# Patient Record
Sex: FEMALE | Race: WHITE | NOT HISPANIC OR LATINO | Employment: OTHER | URBAN - METROPOLITAN AREA
[De-identification: names, ages, dates, MRNs, and addresses within clinical notes are randomized per-mention and may not be internally consistent; named-entity substitution may affect disease eponyms.]

---

## 2024-03-05 ENCOUNTER — HOSPITAL ENCOUNTER (OUTPATIENT)
Dept: RADIOLOGY | Facility: CLINIC | Age: 69
Discharge: HOME | End: 2024-03-05
Payer: MEDICARE

## 2024-03-05 DIAGNOSIS — Z12.31 ENCOUNTER FOR SCREENING MAMMOGRAM FOR MALIGNANT NEOPLASM OF BREAST: ICD-10-CM

## 2024-03-05 PROCEDURE — 77067 SCR MAMMO BI INCL CAD: CPT | Performed by: RADIOLOGY

## 2024-03-05 PROCEDURE — 77067 SCR MAMMO BI INCL CAD: CPT

## 2024-03-05 PROCEDURE — 77063 BREAST TOMOSYNTHESIS BI: CPT | Performed by: RADIOLOGY

## 2024-07-22 ENCOUNTER — APPOINTMENT (OUTPATIENT)
Dept: CARDIOLOGY | Facility: CLINIC | Age: 69
End: 2024-07-22
Payer: MEDICARE

## 2024-07-22 VITALS
HEIGHT: 66 IN | OXYGEN SATURATION: 98 % | HEART RATE: 80 BPM | SYSTOLIC BLOOD PRESSURE: 132 MMHG | BODY MASS INDEX: 38.57 KG/M2 | WEIGHT: 240 LBS | DIASTOLIC BLOOD PRESSURE: 82 MMHG

## 2024-07-22 DIAGNOSIS — Z98.890 HISTORY OF CEA (CAROTID ENDARTERECTOMY): ICD-10-CM

## 2024-07-22 DIAGNOSIS — G89.29 CHRONIC MIDLINE LOW BACK PAIN WITHOUT SCIATICA: ICD-10-CM

## 2024-07-22 DIAGNOSIS — R00.0 SINUS TACHYCARDIA: Primary | ICD-10-CM

## 2024-07-22 DIAGNOSIS — M54.50 CHRONIC MIDLINE LOW BACK PAIN WITHOUT SCIATICA: ICD-10-CM

## 2024-07-22 DIAGNOSIS — E11.9 TYPE 2 DIABETES MELLITUS WITHOUT COMPLICATION, UNSPECIFIED WHETHER LONG TERM INSULIN USE (MULTI): ICD-10-CM

## 2024-07-22 DIAGNOSIS — I10 BENIGN ESSENTIAL HYPERTENSION: ICD-10-CM

## 2024-07-22 PROBLEM — I63.9 CEREBRAL INFARCTION (MULTI): Status: ACTIVE | Noted: 2024-07-22

## 2024-07-22 PROBLEM — D50.9 IRON DEFICIENCY ANEMIA: Status: ACTIVE | Noted: 2023-10-13

## 2024-07-22 PROBLEM — R94.31 ABNORMAL EKG: Status: ACTIVE | Noted: 2024-07-22

## 2024-07-22 PROBLEM — M47.26 OSTEOARTHRITIS OF SPINE WITH RADICULOPATHY, LUMBAR REGION: Status: ACTIVE | Noted: 2023-11-03

## 2024-07-22 PROBLEM — M41.27 OTHER IDIOPATHIC SCOLIOSIS, LUMBOSACRAL REGION: Status: ACTIVE | Noted: 2023-11-03

## 2024-07-22 PROBLEM — Z91.199 NON COMPLIANCE WITH MEDICAL TREATMENT: Status: ACTIVE | Noted: 2023-11-03

## 2024-07-22 PROBLEM — R79.89 ELEVATED LFTS: Status: ACTIVE | Noted: 2023-11-03

## 2024-07-22 PROBLEM — R19.5 POSITIVE COLORECTAL CANCER SCREENING USING COLOGUARD TEST: Status: ACTIVE | Noted: 2023-10-13

## 2024-07-22 PROBLEM — I77.9 BILATERAL CAROTID ARTERY DISEASE (CMS-HCC): Status: ACTIVE | Noted: 2024-07-22

## 2024-07-22 PROCEDURE — 3075F SYST BP GE 130 - 139MM HG: CPT | Performed by: INTERNAL MEDICINE

## 2024-07-22 PROCEDURE — 4010F ACE/ARB THERAPY RXD/TAKEN: CPT | Performed by: INTERNAL MEDICINE

## 2024-07-22 PROCEDURE — 1160F RVW MEDS BY RX/DR IN RCRD: CPT | Performed by: INTERNAL MEDICINE

## 2024-07-22 PROCEDURE — 3008F BODY MASS INDEX DOCD: CPT | Performed by: INTERNAL MEDICINE

## 2024-07-22 PROCEDURE — 99203 OFFICE O/P NEW LOW 30 MIN: CPT | Performed by: INTERNAL MEDICINE

## 2024-07-22 PROCEDURE — 3079F DIAST BP 80-89 MM HG: CPT | Performed by: INTERNAL MEDICINE

## 2024-07-22 PROCEDURE — 93000 ELECTROCARDIOGRAM COMPLETE: CPT | Performed by: INTERNAL MEDICINE

## 2024-07-22 PROCEDURE — 1159F MED LIST DOCD IN RCRD: CPT | Performed by: INTERNAL MEDICINE

## 2024-07-22 RX ORDER — INSULIN GLARGINE 300 U/ML
INJECTION, SOLUTION SUBCUTANEOUS
COMMUNITY
Start: 2024-02-22

## 2024-07-22 RX ORDER — METOPROLOL TARTRATE 25 MG/1
1 TABLET, FILM COATED ORAL DAILY
COMMUNITY
Start: 2018-02-01

## 2024-07-22 RX ORDER — INSULIN LISPRO 100 [IU]/ML
20 INJECTION, SOLUTION INTRAVENOUS; SUBCUTANEOUS
COMMUNITY

## 2024-07-22 RX ORDER — ERGOCALCIFEROL 1.25 MG/1
1.25 CAPSULE ORAL
COMMUNITY
Start: 2024-06-24 | End: 2024-09-16

## 2024-07-22 RX ORDER — GLIMEPIRIDE 2 MG/1
2 TABLET ORAL 2 TIMES DAILY
COMMUNITY
Start: 2016-01-04

## 2024-07-22 RX ORDER — AMLODIPINE BESYLATE 10 MG/1
1 TABLET ORAL DAILY
COMMUNITY
Start: 2014-01-13

## 2024-07-22 RX ORDER — OMEPRAZOLE 20 MG/1
20 TABLET, DELAYED RELEASE ORAL
COMMUNITY

## 2024-07-22 RX ORDER — ASPIRIN 81 MG/1
81 TABLET ORAL
COMMUNITY

## 2024-07-22 RX ORDER — LISINOPRIL 5 MG/1
1 TABLET ORAL DAILY
COMMUNITY
Start: 2014-01-21

## 2024-07-22 RX ORDER — SIMVASTATIN 20 MG/1
1 TABLET, FILM COATED ORAL DAILY
COMMUNITY
Start: 2013-11-25

## 2024-07-22 RX ORDER — METFORMIN HYDROCHLORIDE 1000 MG/1
1000 TABLET ORAL
COMMUNITY
Start: 2014-01-13

## 2024-07-22 NOTE — PROGRESS NOTES
"  Tameka Bear  is a 68 y.o. year old female who presents for sinus tachycardia.  No chest pain, no palpitations, no dyspnea, no edmea    Blood pressure 180/88, pulse 80, height 1.676 m (5' 6\"), weight 109 kg (240 lb), SpO2 98%.   Patient has no known allergies.  History reviewed. No pertinent past medical history.  Past Surgical History:   Procedure Laterality Date   • OTHER SURGICAL HISTORY  03/07/2016    Endarterectomy Common Carotid     No family history on file.  @SOC    Current Outpatient Medications   Medication Sig Dispense Refill   • amLODIPine (Norvasc) 10 mg tablet Take 1 tablet (10 mg) by mouth once daily.     • ergocalciferol (Vitamin D-2) 1.25 MG (48526 UT) capsule Take 1 capsule (1,250 mcg) by mouth.     • glimepiride (Amaryl) 2 mg tablet Take 2 tablets (4 mg) by mouth 2 times a day.     • lisinopril 5 mg tablet Take 1 tablet (5 mg) by mouth once daily.     • metFORMIN (Glucophage) 1,000 mg tablet Take 1 tablet (1,000 mg) by mouth 2 times daily (morning and late afternoon).     • metoprolol tartrate (Lopressor) 25 mg tablet Take 1 tablet (25 mg) by mouth once daily.     • simvastatin (Zocor) 20 mg tablet Take 1 tablet (20 mg) by mouth once daily.     • Toujeo SoloStar U-300 Insulin 300 unit/mL (1.5 mL) injection      • aspirin 81 mg EC tablet Take 1 tablet (81 mg) by mouth once daily.     • insulin lispro (HumaLOG) 100 unit/mL injection Inject 20 Units under the skin.     • omeprazole OTC (PriLOSEC OTC) 20 mg EC tablet Take 1 tablet (20 mg) by mouth.       No current facility-administered medications for this visit.        ROS  Review of Systems   All other systems reviewed and are negative.      Physical Exam  Physical Exam     EKG  Encounter Date: 07/22/24   ECG 12 Lead    Narrative    NSR at 79/min., possible old IWMI       Problem List Items Addressed This Visit       Benign essential hypertension - Primary    Relevant Orders    ECG 12 Lead (Completed)    Sinus tachycardia    Type 2 " diabetes mellitus (Multi)         Same meds with EKG return 6 months with EKG      Homer Cuellar MD

## 2024-07-22 NOTE — PROGRESS NOTES
"  Subjective  Tavo Bear  is a 68 y.o. year old female who presents for sinus tachycardia.  She notes chronic back pain.  No palpitations,no chest pain no dyspnea, no edema    Blood pressure 132/82, pulse 80, height 1.676 m (5' 6\"), weight 109 kg (240 lb), SpO2 98%.   Patient has no known allergies.  History reviewed. No pertinent past medical history.  Past Surgical History:   Procedure Laterality Date    OTHER SURGICAL HISTORY  03/07/2016    Endarterectomy Common Carotid     No family history on file.  @SOC    Current Outpatient Medications   Medication Sig Dispense Refill    amLODIPine (Norvasc) 10 mg tablet Take 1 tablet (10 mg) by mouth once daily.      ergocalciferol (Vitamin D-2) 1.25 MG (43931 UT) capsule Take 1 capsule (1,250 mcg) by mouth.      glimepiride (Amaryl) 2 mg tablet Take 2 tablets (4 mg) by mouth 2 times a day.      lisinopril 5 mg tablet Take 1 tablet (5 mg) by mouth once daily.      metFORMIN (Glucophage) 1,000 mg tablet Take 1 tablet (1,000 mg) by mouth 2 times daily (morning and late afternoon).      metoprolol tartrate (Lopressor) 25 mg tablet Take 1 tablet (25 mg) by mouth once daily.      simvastatin (Zocor) 20 mg tablet Take 1 tablet (20 mg) by mouth once daily.      Toujeo SoloStar U-300 Insulin 300 unit/mL (1.5 mL) injection       aspirin 81 mg EC tablet Take 1 tablet (81 mg) by mouth once daily.      insulin lispro (HumaLOG) 100 unit/mL injection Inject 20 Units under the skin.      omeprazole OTC (PriLOSEC OTC) 20 mg EC tablet Take 1 tablet (20 mg) by mouth.       No current facility-administered medications for this visit.        ROS  Review of Systems   All other systems reviewed and are negative.      Physical Exam  Physical Exam  Constitutional:       Appearance: She is obese.   HENT:      Head: Normocephalic and atraumatic.   Cardiovascular:      Rate and Rhythm: Normal rate and regular rhythm.   Pulmonary:      Effort: Pulmonary effort is normal.      Breath sounds: Normal " breath sounds.   Musculoskeletal:      Right lower leg: No edema.      Left lower leg: No edema.   Skin:     General: Skin is warm and dry.   Neurological:      General: No focal deficit present.      Mental Status: She is alert and oriented to person, place, and time.   Psychiatric:         Mood and Affect: Mood normal.         Behavior: Behavior normal.          EKG  Encounter Date: 07/22/24   ECG 12 Lead    Narrative    NSR at 79/min., possible old IWMI       Problem List Items Addressed This Visit       Benign essential hypertension    Relevant Orders    ECG 12 Lead (Completed)    Chronic midline low back pain without sciatica    History of CEA (carotid endarterectomy)    Sinus tachycardia - Primary     3/13/18 echocardiogram LVEF=50-55%, stage I diasolic dysfunction         Relevant Orders    Follow Up In Cardiology    Type 2 diabetes mellitus (Multi)         Return 6 months with EKG      oHmer Cuellar MD

## 2024-10-18 ENCOUNTER — APPOINTMENT (OUTPATIENT)
Dept: NEUROSURGERY | Facility: CLINIC | Age: 69
End: 2024-10-18
Payer: MEDICARE

## 2024-10-18 VITALS
BODY MASS INDEX: 33.11 KG/M2 | HEIGHT: 66 IN | WEIGHT: 206 LBS | DIASTOLIC BLOOD PRESSURE: 80 MMHG | SYSTOLIC BLOOD PRESSURE: 160 MMHG | HEART RATE: 82 BPM

## 2024-10-18 DIAGNOSIS — M48.062 NEUROGENIC CLAUDICATION DUE TO LUMBAR SPINAL STENOSIS: Primary | ICD-10-CM

## 2024-10-18 DIAGNOSIS — M81.0 OSTEOPOROSIS, SENILE: ICD-10-CM

## 2024-10-18 PROCEDURE — 99205 OFFICE O/P NEW HI 60 MIN: CPT | Performed by: STUDENT IN AN ORGANIZED HEALTH CARE EDUCATION/TRAINING PROGRAM

## 2024-10-18 PROCEDURE — 3077F SYST BP >= 140 MM HG: CPT | Performed by: STUDENT IN AN ORGANIZED HEALTH CARE EDUCATION/TRAINING PROGRAM

## 2024-10-18 PROCEDURE — 1036F TOBACCO NON-USER: CPT | Performed by: STUDENT IN AN ORGANIZED HEALTH CARE EDUCATION/TRAINING PROGRAM

## 2024-10-18 PROCEDURE — 4010F ACE/ARB THERAPY RXD/TAKEN: CPT | Performed by: STUDENT IN AN ORGANIZED HEALTH CARE EDUCATION/TRAINING PROGRAM

## 2024-10-18 PROCEDURE — 3079F DIAST BP 80-89 MM HG: CPT | Performed by: STUDENT IN AN ORGANIZED HEALTH CARE EDUCATION/TRAINING PROGRAM

## 2024-10-18 PROCEDURE — 3008F BODY MASS INDEX DOCD: CPT | Performed by: STUDENT IN AN ORGANIZED HEALTH CARE EDUCATION/TRAINING PROGRAM

## 2024-10-18 PROCEDURE — 1159F MED LIST DOCD IN RCRD: CPT | Performed by: STUDENT IN AN ORGANIZED HEALTH CARE EDUCATION/TRAINING PROGRAM

## 2024-10-18 ASSESSMENT — ENCOUNTER SYMPTOMS
LOSS OF SENSATION IN FEET: 1
DEPRESSION: 0
OCCASIONAL FEELINGS OF UNSTEADINESS: 1

## 2024-10-18 NOTE — PROGRESS NOTES
Tavo Bear is a 68 y.o. year old female p/w bilateral lower extremity numbness      14/14 systems reviewed and negative other than what is listed in the history of present illness        History reviewed. No pertinent past medical history.    Past Surgical History:   Procedure Laterality Date    OTHER SURGICAL HISTORY  03/07/2016    Endarterectomy Common Carotid           Current Outpatient Medications:     amLODIPine (Norvasc) 10 mg tablet, Take 1 tablet (10 mg) by mouth once daily., Disp: , Rfl:     aspirin 81 mg EC tablet, Take 1 tablet (81 mg) by mouth once daily., Disp: , Rfl:     glimepiride (Amaryl) 2 mg tablet, Take 2 tablets (4 mg) by mouth 2 times a day., Disp: , Rfl:     insulin lispro (HumaLOG) 100 unit/mL injection, Inject 20 Units under the skin., Disp: , Rfl:     lisinopril 5 mg tablet, Take 1 tablet (5 mg) by mouth once daily., Disp: , Rfl:     metFORMIN (Glucophage) 1,000 mg tablet, Take 1 tablet (1,000 mg) by mouth 2 times daily (morning and late afternoon)., Disp: , Rfl:     metoprolol tartrate (Lopressor) 25 mg tablet, Take 1 tablet (25 mg) by mouth once daily., Disp: , Rfl:     omeprazole OTC (PriLOSEC OTC) 20 mg EC tablet, Take 1 tablet (20 mg) by mouth., Disp: , Rfl:     simvastatin (Zocor) 20 mg tablet, Take 1 tablet (20 mg) by mouth once daily., Disp: , Rfl:     Toujeo SoloStar U-300 Insulin 300 unit/mL (1.5 mL) injection, , Disp: , Rfl:       Vitals:    10/18/24 1131   BP: 160/80   Pulse: 82     Objective   General: Well developed, awake/alert/oriented x3, no distress, alert and cooperative  Skin: Warm and dry, no lesions, no rashes  ENMT: Mucous membranes moist, no apparent injury, no lesions seen  Head/Neck: Neck Supple, no apparent injury  Respiratory/Thorax: Normal breath sounds with good chest expansion, thorax symmetric  Cardiovascular: No pitting edema, no JVD    Motor Strength: 5/5 Throughout all extremities    Muscle Bulk: Normal and symmetric in all extremities    Posture:   --  Cervical: Normal  -- Thoracic: Normal  -- Lumbar : Normal  Paraspinal muscle spasm/tenderness absent.     Sensation: intact to light touch     Relevant Results    needs DEXA scan upright xrays with L4-5 grade I spondy and levoscoloiosis, L1-2 mild retrolisthesis needs CT L-spine MRI L-spine: some coronal scoliosis, right L1-2 foraminal stenosis, L1-2 retrolisthesis, L4-5 spondy with obliteration of canal, L3-4 severe central stenosis        Problem List Items Addressed This Visit    None  Visit Diagnoses       Neurogenic claudication due to lumbar spinal stenosis    -  Primary    Relevant Orders    Referral to Physical Therapy    Referral to [facility]    XR DEXA bone density axial skeleton w VFA    XR EOS full spine 2 view scolosis    CT lumbar spine wo IV contrast    Osteoporosis, senile        Relevant Orders    XR DEXA bone density axial skeleton w VFA               Assessment/Plan       Ms. Tavo Bear is a very nice 68 y.o. year old female presenting today with lower back pain with numbness in bilateral lower extremities when standing for any period of time.  She denies weakness/ perianal anesthesia/bowel and bladder incontinence. She endorses some mild bladder leakage but states its from many years ago and related to child birth.     She a history of stroke (2012) with no residual deficits and on ASA 81. She is diabetic and her last HbA1c is 8.8%.    We reviewed Mri scans together today showing severe stenosis from L3-5 with L4-5 spondylolisthesis. On examination, she is moving all four limbs with full strength and has intact neurological examination.     We discussed surgery in the form of a laminectomy and fusion. However, she is at elevated risk due to her HbA1c being so high. I have referred her to a nutritionist and her PCP for better glucose management.     I have referred to physical therapy for core strengthening. till the date of surgery. I have ordered DEXA scan to assess bone density and  CT scan to assess bone anatomy. I have ordered EOS X-rays from head to foot to assess global alignment.     She will follow up in 6-8 weeks to check her sugar levels and to discuss further about surgery.    Larry Kaba MD    of Neurosurgery   [facility] [place]   [facility]   Office: [phone]  Fax: [phone]      Scribe Attestation  By signing my name below, I, Denisse Adrian , Scrjohanne   attest that this documentation has been prepared under the direction and in the presence of Larry Kaba MD.

## 2024-10-23 ENCOUNTER — HOSPITAL ENCOUNTER (OUTPATIENT)
Dept: RADIOLOGY | Facility: HOSPITAL | Age: 69
Discharge: HOME | End: 2024-10-23
Payer: MEDICARE

## 2024-10-23 DIAGNOSIS — M48.062 NEUROGENIC CLAUDICATION DUE TO LUMBAR SPINAL STENOSIS: ICD-10-CM

## 2024-10-23 PROCEDURE — 72082 X-RAY EXAM ENTIRE SPI 2/3 VW: CPT

## 2024-10-23 PROCEDURE — 72082 X-RAY EXAM ENTIRE SPI 2/3 VW: CPT | Performed by: RADIOLOGY

## 2025-01-06 PROBLEM — E55.9 VITAMIN D DEFICIENCY: Status: ACTIVE | Noted: 2024-07-30

## 2025-01-06 PROBLEM — E78.5 DYSLIPIDEMIA: Status: ACTIVE | Noted: 2024-07-30

## 2025-01-06 PROBLEM — K64.8 INTERNAL HEMORRHOIDS WITHOUT COMPLICATION: Status: ACTIVE | Noted: 2024-07-30

## 2025-01-06 PROBLEM — Z86.73 HISTORY OF CVA (CEREBROVASCULAR ACCIDENT): Status: ACTIVE | Noted: 2024-07-30

## 2025-01-06 PROBLEM — G54.1 LUMBOSACRAL PLEXUS LESION: Status: ACTIVE | Noted: 2024-06-01

## 2025-01-23 ENCOUNTER — APPOINTMENT (OUTPATIENT)
Dept: CARDIOLOGY | Facility: CLINIC | Age: 70
End: 2025-01-23
Payer: MEDICARE

## 2025-02-03 PROBLEM — E66.811 CLASS 1 OBESITY WITH BODY MASS INDEX (BMI) OF 33.0 TO 33.9 IN ADULT: Status: ACTIVE | Noted: 2025-02-03

## 2025-02-24 ENCOUNTER — APPOINTMENT (OUTPATIENT)
Dept: CARDIOLOGY | Facility: CLINIC | Age: 70
End: 2025-02-24
Payer: MEDICARE

## 2025-02-24 VITALS
HEIGHT: 66 IN | HEART RATE: 76 BPM | SYSTOLIC BLOOD PRESSURE: 126 MMHG | WEIGHT: 231 LBS | BODY MASS INDEX: 37.12 KG/M2 | DIASTOLIC BLOOD PRESSURE: 80 MMHG | OXYGEN SATURATION: 95 %

## 2025-02-24 DIAGNOSIS — E66.812 CLASS 2 OBESITY: ICD-10-CM

## 2025-02-24 DIAGNOSIS — R00.0 SINUS TACHYCARDIA: Primary | ICD-10-CM

## 2025-02-24 DIAGNOSIS — E78.5 DYSLIPIDEMIA: ICD-10-CM

## 2025-02-24 DIAGNOSIS — M47.26 OSTEOARTHRITIS OF SPINE WITH RADICULOPATHY, LUMBAR REGION: ICD-10-CM

## 2025-02-24 DIAGNOSIS — I10 BENIGN ESSENTIAL HYPERTENSION: ICD-10-CM

## 2025-02-24 DIAGNOSIS — E11.9 TYPE 2 DIABETES MELLITUS WITHOUT COMPLICATION, UNSPECIFIED WHETHER LONG TERM INSULIN USE (MULTI): ICD-10-CM

## 2025-02-24 DIAGNOSIS — Z01.818 PRE-OP EVALUATION: ICD-10-CM

## 2025-02-24 LAB
ATRIAL RATE: 79 BPM
P AXIS: 50 DEGREES
P OFFSET: 196 MS
P ONSET: 146 MS
PR INTERVAL: 156 MS
Q ONSET: 224 MS
QRS COUNT: 13 BEATS
QRS DURATION: 84 MS
QT INTERVAL: 364 MS
QTC CALCULATION(BAZETT): 417 MS
QTC FREDERICIA: 399 MS
R AXIS: -12 DEGREES
T AXIS: 41 DEGREES
T OFFSET: 406 MS
VENTRICULAR RATE: 79 BPM

## 2025-02-24 PROCEDURE — 4010F ACE/ARB THERAPY RXD/TAKEN: CPT | Performed by: INTERNAL MEDICINE

## 2025-02-24 PROCEDURE — 99214 OFFICE O/P EST MOD 30 MIN: CPT | Performed by: INTERNAL MEDICINE

## 2025-02-24 PROCEDURE — 93005 ELECTROCARDIOGRAM TRACING: CPT | Performed by: INTERNAL MEDICINE

## 2025-02-24 PROCEDURE — 1036F TOBACCO NON-USER: CPT | Performed by: INTERNAL MEDICINE

## 2025-02-24 PROCEDURE — 3008F BODY MASS INDEX DOCD: CPT | Performed by: INTERNAL MEDICINE

## 2025-02-24 PROCEDURE — 93010 ELECTROCARDIOGRAM REPORT: CPT | Performed by: INTERNAL MEDICINE

## 2025-02-24 PROCEDURE — 1159F MED LIST DOCD IN RCRD: CPT | Performed by: INTERNAL MEDICINE

## 2025-02-24 PROCEDURE — 3074F SYST BP LT 130 MM HG: CPT | Performed by: INTERNAL MEDICINE

## 2025-02-24 PROCEDURE — 1160F RVW MEDS BY RX/DR IN RCRD: CPT | Performed by: INTERNAL MEDICINE

## 2025-02-24 PROCEDURE — 3079F DIAST BP 80-89 MM HG: CPT | Performed by: INTERNAL MEDICINE

## 2025-02-24 RX ORDER — INSULIN LISPRO 100 [IU]/ML
INJECTION, SOLUTION INTRAVENOUS; SUBCUTANEOUS
COMMUNITY
Start: 2024-01-15

## 2025-02-24 RX ORDER — SEMAGLUTIDE 0.68 MG/ML
INJECTION, SOLUTION SUBCUTANEOUS
COMMUNITY
Start: 2025-02-06

## 2025-02-24 NOTE — PROGRESS NOTES
"  Subjective  Tavo Bear  is a 69 y.o. year old female who presents for risk assessmnet for back surgery for lumbar stenosis.  No chest pain, no dyspnea, no palpitations  no edema    Blood pressure 126/80, pulse 76, height 1.676 m (5' 6\"), weight 105 kg (231 lb), SpO2 95%.   Patient has no known allergies.  History reviewed. No pertinent past medical history.  Past Surgical History:   Procedure Laterality Date    OTHER SURGICAL HISTORY  03/07/2016    Endarterectomy Common Carotid     No family history on file.  @SOC    Current Outpatient Medications   Medication Sig Dispense Refill    insulin lispro (HumaLOG KwikPen Insulin) 100 unit/mL injection INJECT 20 UNITS UNDER THE SKIN IN THE MORNING AND 20 UNITS AT NOON AND 20 UNITS IN THE EVENING. INJECT WITH MEALS      Ozempic 0.25 mg or 0.5 mg (2 mg/3 mL) pen injector       amLODIPine (Norvasc) 10 mg tablet Take 1 tablet (10 mg) by mouth once daily.      aspirin 81 mg EC tablet Take 1 tablet (81 mg) by mouth once daily.      glimepiride (Amaryl) 2 mg tablet Take 2 tablets (4 mg) by mouth 2 times a day.      insulin lispro (HumaLOG) 100 unit/mL injection Inject 20 Units under the skin.      lisinopril 5 mg tablet Take 1 tablet (5 mg) by mouth once daily.      metFORMIN (Glucophage) 1,000 mg tablet Take 1 tablet (1,000 mg) by mouth 2 times daily (morning and late afternoon).      metoprolol tartrate (Lopressor) 25 mg tablet Take 1 tablet (25 mg) by mouth once daily.      omeprazole OTC (PriLOSEC OTC) 20 mg EC tablet Take 1 tablet (20 mg) by mouth.      simvastatin (Zocor) 20 mg tablet Take 1 tablet (20 mg) by mouth once daily.      Toujeo SoloStar U-300 Insulin 300 unit/mL (1.5 mL) injection        No current facility-administered medications for this visit.        ROS  Review of Systems   All other systems reviewed and are negative.      Physical Exam  Physical Exam  Constitutional:       Appearance: She is obese.   HENT:      Head: Atraumatic.   Cardiovascular:      " Rate and Rhythm: Normal rate and regular rhythm.   Pulmonary:      Effort: Pulmonary effort is normal.      Breath sounds: Normal breath sounds.   Abdominal:      Palpations: Abdomen is soft.   Musculoskeletal:      Right lower leg: No edema.      Left lower leg: No edema.   Skin:     General: Skin is warm and dry.   Neurological:      General: No focal deficit present.      Mental Status: She is alert and oriented to person, place, and time.   Psychiatric:         Mood and Affect: Mood normal.         Behavior: Behavior normal.          EKG  No results found for this or any previous visit (from the past 4464 hours).    Problem List Items Addressed This Visit       Benign essential hypertension    Osteoarthritis of spine with radiculopathy, lumbar region    Sinus tachycardia - Primary     NSR at 79/min., at 2/24/25 visit         Relevant Orders    ECG 12 Lead    Type 2 diabetes mellitus    Dyslipidemia    Class 2 obesity    Pre-op evaluation     For back surgery at low to moderate risk for moderate risk surgery            Same meds  Proceed with back surgery as planned  Return 6 months with EKG    Homer Cuellar MD

## 2025-07-30 ENCOUNTER — HOSPITAL ENCOUNTER (OUTPATIENT)
Dept: RADIOLOGY | Facility: CLINIC | Age: 70
Discharge: HOME | End: 2025-07-30
Payer: MEDICARE

## 2025-07-30 VITALS — HEIGHT: 66 IN | WEIGHT: 231 LBS | BODY MASS INDEX: 37.12 KG/M2

## 2025-07-30 DIAGNOSIS — Z12.31 ENCOUNTER FOR SCREENING MAMMOGRAM FOR MALIGNANT NEOPLASM OF BREAST: ICD-10-CM

## 2025-07-30 DIAGNOSIS — R29.818 NEUROGENIC CLAUDICATION: ICD-10-CM

## 2025-07-30 DIAGNOSIS — M81.0 OSTEOPOROSIS: ICD-10-CM

## 2025-07-30 PROCEDURE — 77080 DXA BONE DENSITY AXIAL: CPT | Performed by: RADIOLOGY

## 2025-07-30 PROCEDURE — 77063 BREAST TOMOSYNTHESIS BI: CPT | Performed by: RADIOLOGY

## 2025-07-30 PROCEDURE — 77067 SCR MAMMO BI INCL CAD: CPT | Performed by: RADIOLOGY

## 2025-07-30 PROCEDURE — 77080 DXA BONE DENSITY AXIAL: CPT

## 2025-07-30 PROCEDURE — 77063 BREAST TOMOSYNTHESIS BI: CPT

## 2025-08-01 ENCOUNTER — RESULTS FOLLOW-UP (OUTPATIENT)
Dept: NEUROSURGERY | Facility: CLINIC | Age: 70
End: 2025-08-01
Payer: MEDICARE

## 2025-08-01 NOTE — TELEPHONE ENCOUNTER
----- Message from Larry Kaba sent at 7/31/2025  4:58 PM EDT -----  Regarding: FW:  Looks good  ----- Message -----  From: Interface, Radiology Results In  Sent: 7/30/2025   3:47 PM EDT  To: Larry Kaba MD

## 2025-08-05 PROBLEM — E66.812 CLASS 2 OBESITY WITH BODY MASS INDEX (BMI) OF 37.0 TO 37.9 IN ADULT: Status: ACTIVE | Noted: 2025-02-03

## 2025-08-05 PROBLEM — E66.812 CLASS 2 OBESITY: Status: RESOLVED | Noted: 2025-02-24 | Resolved: 2025-08-05

## 2025-08-18 ENCOUNTER — APPOINTMENT (OUTPATIENT)
Dept: CARDIOLOGY | Facility: CLINIC | Age: 70
End: 2025-08-18
Payer: MEDICARE

## 2025-08-25 ENCOUNTER — APPOINTMENT (OUTPATIENT)
Dept: CARDIOLOGY | Facility: CLINIC | Age: 70
End: 2025-08-25
Payer: MEDICARE

## 2025-08-25 ENCOUNTER — APPOINTMENT (OUTPATIENT)
Dept: NEUROSURGERY | Facility: CLINIC | Age: 70
End: 2025-08-25
Payer: MEDICARE

## 2025-08-25 VITALS
SYSTOLIC BLOOD PRESSURE: 122 MMHG | BODY MASS INDEX: 34.55 KG/M2 | HEART RATE: 85 BPM | DIASTOLIC BLOOD PRESSURE: 70 MMHG | HEIGHT: 66 IN | WEIGHT: 215 LBS

## 2025-08-25 DIAGNOSIS — M48.062 NEUROGENIC CLAUDICATION DUE TO LUMBAR SPINAL STENOSIS: Primary | ICD-10-CM

## 2025-08-25 PROCEDURE — 3078F DIAST BP <80 MM HG: CPT | Performed by: STUDENT IN AN ORGANIZED HEALTH CARE EDUCATION/TRAINING PROGRAM

## 2025-08-25 PROCEDURE — 1036F TOBACCO NON-USER: CPT | Performed by: STUDENT IN AN ORGANIZED HEALTH CARE EDUCATION/TRAINING PROGRAM

## 2025-08-25 PROCEDURE — 99215 OFFICE O/P EST HI 40 MIN: CPT | Performed by: STUDENT IN AN ORGANIZED HEALTH CARE EDUCATION/TRAINING PROGRAM

## 2025-08-25 PROCEDURE — 3008F BODY MASS INDEX DOCD: CPT | Performed by: STUDENT IN AN ORGANIZED HEALTH CARE EDUCATION/TRAINING PROGRAM

## 2025-08-25 PROCEDURE — 3074F SYST BP LT 130 MM HG: CPT | Performed by: STUDENT IN AN ORGANIZED HEALTH CARE EDUCATION/TRAINING PROGRAM

## 2025-08-25 PROCEDURE — 4010F ACE/ARB THERAPY RXD/TAKEN: CPT | Performed by: STUDENT IN AN ORGANIZED HEALTH CARE EDUCATION/TRAINING PROGRAM

## 2025-08-25 ASSESSMENT — PATIENT HEALTH QUESTIONNAIRE - PHQ9
1. LITTLE INTEREST OR PLEASURE IN DOING THINGS: NOT AT ALL
SUM OF ALL RESPONSES TO PHQ9 QUESTIONS 1 AND 2: 0
2. FEELING DOWN, DEPRESSED OR HOPELESS: NOT AT ALL